# Patient Record
Sex: MALE | Race: BLACK OR AFRICAN AMERICAN | NOT HISPANIC OR LATINO | Employment: FULL TIME | URBAN - METROPOLITAN AREA
[De-identification: names, ages, dates, MRNs, and addresses within clinical notes are randomized per-mention and may not be internally consistent; named-entity substitution may affect disease eponyms.]

---

## 2018-03-23 ENCOUNTER — RECORDS - HEALTHEAST (OUTPATIENT)
Dept: LAB | Facility: CLINIC | Age: 33
End: 2018-03-23

## 2018-03-23 ENCOUNTER — RECORDS - HEALTHEAST (OUTPATIENT)
Dept: ADMINISTRATIVE | Facility: OTHER | Age: 33
End: 2018-03-23

## 2018-03-23 LAB
ALBUMIN SERPL-MCNC: 3.9 G/DL (ref 3.5–5)
ALP SERPL-CCNC: 79 U/L (ref 45–120)
ALT SERPL W P-5'-P-CCNC: 33 U/L (ref 0–45)
ANION GAP SERPL CALCULATED.3IONS-SCNC: 13 MMOL/L (ref 5–18)
AST SERPL W P-5'-P-CCNC: 26 U/L (ref 0–40)
BILIRUB SERPL-MCNC: 0.4 MG/DL (ref 0–1)
BUN SERPL-MCNC: 10 MG/DL (ref 8–22)
CALCIUM SERPL-MCNC: 9.3 MG/DL (ref 8.5–10.5)
CHLORIDE BLD-SCNC: 106 MMOL/L (ref 98–107)
CHOLEST SERPL-MCNC: 142 MG/DL
CO2 SERPL-SCNC: 23 MMOL/L (ref 22–31)
CREAT SERPL-MCNC: 0.74 MG/DL (ref 0.7–1.3)
FASTING STATUS PATIENT QL REPORTED: NO
GFR SERPL CREATININE-BSD FRML MDRD: >60 ML/MIN/1.73M2
GLUCOSE BLD-MCNC: 103 MG/DL (ref 70–125)
HDLC SERPL-MCNC: 34 MG/DL
LDLC SERPL CALC-MCNC: 91 MG/DL
POTASSIUM BLD-SCNC: 4.1 MMOL/L (ref 3.5–5)
PROT SERPL-MCNC: 7.4 G/DL (ref 6–8)
SODIUM SERPL-SCNC: 142 MMOL/L (ref 136–145)
TRIGL SERPL-MCNC: 86 MG/DL

## 2018-03-28 ENCOUNTER — RECORDS - HEALTHEAST (OUTPATIENT)
Dept: ADMINISTRATIVE | Facility: OTHER | Age: 33
End: 2018-03-28

## 2018-03-29 ENCOUNTER — COMMUNICATION - HEALTHEAST (OUTPATIENT)
Dept: ADMINISTRATIVE | Facility: CLINIC | Age: 33
End: 2018-03-29

## 2018-04-02 ENCOUNTER — RECORDS - HEALTHEAST (OUTPATIENT)
Dept: ADMINISTRATIVE | Facility: OTHER | Age: 33
End: 2018-04-02

## 2018-07-09 ENCOUNTER — OFFICE VISIT - HEALTHEAST (OUTPATIENT)
Dept: ENDOCRINOLOGY | Facility: CLINIC | Age: 33
End: 2018-07-09

## 2018-07-09 DIAGNOSIS — E29.1 HYPOGONADISM MALE: ICD-10-CM

## 2018-07-09 LAB
CALCIUM SERPL-MCNC: 10 MG/DL (ref 8.5–10.5)
CREAT SERPL-MCNC: 0.78 MG/DL (ref 0.7–1.3)
FERRITIN SERPL-MCNC: 64 NG/ML (ref 27–300)
FSH SERPL-ACNC: <3 MIU/ML (ref 0–12)
GFR SERPL CREATININE-BSD FRML MDRD: >60 ML/MIN/1.73M2
LH SERPL-ACNC: 6.2 MIU/ML
POTASSIUM BLD-SCNC: 4 MMOL/L (ref 3.5–5)
PROLACTIN SERPL-MCNC: 13.2 NG/ML (ref 0–15)
PTH-INTACT SERPL-MCNC: 36 PG/ML (ref 10–86)
T4 FREE SERPL-MCNC: 0.9 NG/DL (ref 0.7–1.8)
TSH SERPL DL<=0.005 MIU/L-ACNC: 1.85 UIU/ML (ref 0.3–5)

## 2018-07-09 ASSESSMENT — MIFFLIN-ST. JEOR: SCORE: 1568.1

## 2018-07-10 LAB — 25(OH)D3 SERPL-MCNC: 20.5 NG/ML (ref 30–80)

## 2018-07-11 LAB — TESTOST SERPL-MCNC: 1025 NG/DL (ref 240–871)

## 2018-09-19 ENCOUNTER — COMMUNICATION - HEALTHEAST (OUTPATIENT)
Dept: ADMINISTRATIVE | Facility: CLINIC | Age: 33
End: 2018-09-19

## 2018-10-03 ENCOUNTER — ALLIED HEALTH/NURSE VISIT (OUTPATIENT)
Dept: NURSING | Facility: CLINIC | Age: 33
End: 2018-10-03

## 2018-10-03 DIAGNOSIS — Z23 NEED FOR PROPHYLACTIC VACCINATION AND INOCULATION AGAINST INFLUENZA: Primary | ICD-10-CM

## 2018-10-03 PROCEDURE — 90471 IMMUNIZATION ADMIN: CPT

## 2018-10-03 PROCEDURE — 90686 IIV4 VACC NO PRSV 0.5 ML IM: CPT

## 2018-10-03 PROCEDURE — 99207 ZZC NO CHARGE NURSE ONLY: CPT

## 2018-10-03 NOTE — MR AVS SNAPSHOT
"              After Visit Summary   10/3/2018    Jeremias Salazar    MRN: [id number]           Patient Information     Date Of Birth          1985        Visit Information        Provider Department      10/3/2018 10:40 AM CARE COORDINATOR [facility]        Today's Diagnoses     Need for prophylactic vaccination and inoculation against influenza    -  1       Follow-ups after your visit        Who to contact     If you have questions or need follow up information about today's clinic visit or your schedule please contact [facility] directly at [phone].  Normal or non-critical lab and imaging results will be communicated to you by MyChart, letter or phone within 4 business days after the clinic has received the results. If you do not hear from us within 7 days, please contact the clinic through [employer] or phone. If you have a critical or abnormal lab result, we will notify you by phone as soon as possible.  Submit refill requests through [employer] or call your pharmacy and they will forward the refill request to us. Please allow 3 business days for your refill to be completed.          Additional Information About Your Visit        MyChart Information     GetSnippy lets you send messages to your doctor, view your test results, renew your prescriptions, schedule appointments and more. To sign up, go to [email or web address] . Click on \"Log in\" on the left side of the screen, which will take you to the Welcome page. Then click on \"Sign up Now\" on the right side of the page.     You will be asked to enter the access code listed below, as well as some personal information. Please follow the directions to create your username and password.     Your access code is: [email or web address]  Expires: 2019 11:40 AM     Your access code will  in 90 days. If you need help or a new code, please call your [facility] or [phone].        Care EveryWhere ID     This " is your Care EveryWhere ID. This could be used by other organizations to access your [facility] medical records  [id number]         Blood Pressure from Last 3 Encounters:   No data found for BP    Weight from Last 3 Encounters:   No data found for Wt              We Performed the Following     FLU VACCINE, SPLIT VIRUS, IM (QUADRIVALENT) [03088]- >3 YRS     Vaccine Administration, Initial [53218]        Primary Care Provider    None Specified       No primary provider on file.        Equal Access to Services     ABDULLAHI SANTO : Hadii regan muniz Soaydee, waaxda luncikadaha, qaybta kaalmada chinmayda, edith esquiveljosejuanjose arciniega . So [facility] [phone].    ATENCIÓN: Si habla español, tiene a merchant disposición servicios gratuitos de asistencia lingüística. Rhodaame al [phone].    We comply with applicable federal civil rights laws and [place] laws. We do not discriminate on the basis of race, color, national origin, age, disability, sex, sexual orientation, or gender identity.            Thank you!     Thank you for choosing [facility]  for your care. Our goal is always to provide you with excellent care. Hearing back from our patients is one way we can continue to improve our services. Please take a few minutes to complete the written survey that you may receive in the mail after your visit with us. Thank you!             Your Updated Medication List - Protect others around you: Learn how to safely use, store and throw away your medicines at www.disposemymeds.org.      Notice  As of 10/3/2018 11:40 AM    You have not been prescribed any medications.

## 2018-10-03 NOTE — PROGRESS NOTES

## 2019-01-07 ENCOUNTER — COMMUNICATION - HEALTHEAST (OUTPATIENT)
Dept: ENDOCRINOLOGY | Facility: CLINIC | Age: 34
End: 2019-01-07

## 2019-01-07 DIAGNOSIS — E29.1 HYPOGONADISM MALE: ICD-10-CM

## 2019-01-08 ENCOUNTER — AMBULATORY - HEALTHEAST (OUTPATIENT)
Dept: LAB | Facility: CLINIC | Age: 34
End: 2019-01-08

## 2019-01-08 DIAGNOSIS — E29.1 HYPOGONADISM MALE: ICD-10-CM

## 2019-01-09 LAB — TESTOST SERPL-MCNC: 635 NG/DL (ref 240–871)

## 2019-02-14 ENCOUNTER — HOSPITAL ENCOUNTER (OUTPATIENT)
Facility: CLINIC | Age: 34
Setting detail: OBSERVATION
Discharge: HOME | End: 2019-02-15
Attending: FAMILY MEDICINE | Admitting: SURGERY
Payer: COMMERCIAL

## 2019-02-14 ENCOUNTER — ANESTHESIA (OUTPATIENT)
Dept: SURGERY | Facility: CLINIC | Age: 34
End: 2019-02-14
Payer: COMMERCIAL

## 2019-02-14 ENCOUNTER — ANESTHESIA EVENT (OUTPATIENT)
Dept: SURGERY | Facility: CLINIC | Age: 34
End: 2019-02-14
Payer: COMMERCIAL

## 2019-02-14 DIAGNOSIS — Z90.49 S/P LAPAROSCOPIC APPENDECTOMY: Primary | ICD-10-CM

## 2019-02-14 DIAGNOSIS — K35.30 ACUTE APPENDICITIS WITH LOCALIZED PERITONITIS, WITHOUT PERFORATION, ABSCESS, OR GANGRENE: ICD-10-CM

## 2019-02-14 LAB
ALBUMIN SERPL-MCNC: 3.9 G/DL (ref 3.4–5)
ALP SERPL-CCNC: 82 U/L (ref 40–150)
ALT SERPL W P-5'-P-CCNC: 31 U/L (ref 0–70)
ANION GAP SERPL CALCULATED.3IONS-SCNC: 7 MMOL/L (ref 3–14)
AST SERPL W P-5'-P-CCNC: 22 U/L (ref 0–45)
BASOPHILS # BLD AUTO: 0 10E9/L (ref 0–0.2)
BASOPHILS NFR BLD AUTO: 0.1 %
BILIRUB SERPL-MCNC: 0.5 MG/DL (ref 0.2–1.3)
BUN SERPL-MCNC: 6 MG/DL (ref 7–30)
CALCIUM SERPL-MCNC: 9 MG/DL (ref 8.5–10.1)
CHLORIDE SERPL-SCNC: 104 MMOL/L (ref 94–109)
CO2 SERPL-SCNC: 28 MMOL/L (ref 20–32)
CREAT SERPL-MCNC: 0.74 MG/DL (ref 0.66–1.25)
DIFFERENTIAL METHOD BLD: NORMAL
EOSINOPHIL # BLD AUTO: 0.1 10E9/L (ref 0–0.7)
EOSINOPHIL NFR BLD AUTO: 0.6 %
ERYTHROCYTE [DISTWIDTH] IN BLOOD BY AUTOMATED COUNT: 12.7 % (ref 10–15)
GFR SERPL CREATININE-BSD FRML MDRD: >90 ML/MIN/{1.73_M2}
GLUCOSE SERPL-MCNC: 84 MG/DL (ref 70–99)
HCT VFR BLD AUTO: 41.5 % (ref 40–53)
HGB BLD-MCNC: 13.7 G/DL (ref 13.3–17.7)
IMM GRANULOCYTES # BLD: 0 10E9/L (ref 0–0.4)
IMM GRANULOCYTES NFR BLD: 0.2 %
INR PPP: 1.09 (ref 0.86–1.14)
LYMPHOCYTES # BLD AUTO: 1.6 10E9/L (ref 0.8–5.3)
LYMPHOCYTES NFR BLD AUTO: 16.8 %
MCH RBC QN AUTO: 30.6 PG (ref 26.5–33)
MCHC RBC AUTO-ENTMCNC: 33 G/DL (ref 31.5–36.5)
MCV RBC AUTO: 93 FL (ref 78–100)
MONOCYTES # BLD AUTO: 0.7 10E9/L (ref 0–1.3)
MONOCYTES NFR BLD AUTO: 6.8 %
NEUTROPHILS # BLD AUTO: 7.4 10E9/L (ref 1.6–8.3)
NEUTROPHILS NFR BLD AUTO: 75.5 %
NRBC # BLD AUTO: 0 10*3/UL
NRBC BLD AUTO-RTO: 0 /100
PLATELET # BLD AUTO: 184 10E9/L (ref 150–450)
POTASSIUM SERPL-SCNC: 4.3 MMOL/L (ref 3.4–5.3)
PROT SERPL-MCNC: 7.8 G/DL (ref 6.8–8.8)
RBC # BLD AUTO: 4.48 10E12/L (ref 4.4–5.9)
SODIUM SERPL-SCNC: 139 MMOL/L (ref 133–144)
WBC # BLD AUTO: 9.7 10E9/L (ref 4–11)

## 2019-02-14 PROCEDURE — 37000008 ZZH ANESTHESIA TECHNICAL FEE, 1ST 30 MIN: Performed by: SURGERY

## 2019-02-14 PROCEDURE — 25000128 H RX IP 250 OP 636: Performed by: SURGERY

## 2019-02-14 PROCEDURE — 25800030 ZZH RX IP 258 OP 636: Performed by: INTERNAL MEDICINE

## 2019-02-14 PROCEDURE — 71000014 ZZH RECOVERY PHASE 1 LEVEL 2 FIRST HR: Performed by: SURGERY

## 2019-02-14 PROCEDURE — 25000132 ZZH RX MED GY IP 250 OP 250 PS 637: Performed by: STUDENT IN AN ORGANIZED HEALTH CARE EDUCATION/TRAINING PROGRAM

## 2019-02-14 PROCEDURE — 36000057 ZZH SURGERY LEVEL 3 1ST 30 MIN - UMMC: Performed by: SURGERY

## 2019-02-14 PROCEDURE — 88304 TISSUE EXAM BY PATHOLOGIST: CPT | Performed by: SURGERY

## 2019-02-14 PROCEDURE — 25000128 H RX IP 250 OP 636: Performed by: NURSE ANESTHETIST, CERTIFIED REGISTERED

## 2019-02-14 PROCEDURE — G0378 HOSPITAL OBSERVATION PER HR: HCPCS

## 2019-02-14 PROCEDURE — 25000125 ZZHC RX 250: Performed by: NURSE ANESTHETIST, CERTIFIED REGISTERED

## 2019-02-14 PROCEDURE — 40000170 ZZH STATISTIC PRE-PROCEDURE ASSESSMENT II: Performed by: SURGERY

## 2019-02-14 PROCEDURE — 96367 TX/PROPH/DG ADDL SEQ IV INF: CPT

## 2019-02-14 PROCEDURE — 25000128 H RX IP 250 OP 636: Performed by: ANESTHESIOLOGY

## 2019-02-14 PROCEDURE — 99285 EMERGENCY DEPT VISIT HI MDM: CPT | Mod: Z6 | Performed by: FAMILY MEDICINE

## 2019-02-14 PROCEDURE — 25800030 ZZH RX IP 258 OP 636: Performed by: NURSE ANESTHETIST, CERTIFIED REGISTERED

## 2019-02-14 PROCEDURE — 25000128 H RX IP 250 OP 636: Performed by: STUDENT IN AN ORGANIZED HEALTH CARE EDUCATION/TRAINING PROGRAM

## 2019-02-14 PROCEDURE — 85610 PROTHROMBIN TIME: CPT | Performed by: FAMILY MEDICINE

## 2019-02-14 PROCEDURE — 96360 HYDRATION IV INFUSION INIT: CPT | Performed by: FAMILY MEDICINE

## 2019-02-14 PROCEDURE — 25000128 H RX IP 250 OP 636: Performed by: FAMILY MEDICINE

## 2019-02-14 PROCEDURE — 96361 HYDRATE IV INFUSION ADD-ON: CPT | Performed by: FAMILY MEDICINE

## 2019-02-14 PROCEDURE — 25000566 ZZH SEVOFLURANE, EA 15 MIN: Performed by: SURGERY

## 2019-02-14 PROCEDURE — 96366 THER/PROPH/DIAG IV INF ADDON: CPT | Mod: 59

## 2019-02-14 PROCEDURE — 99285 EMERGENCY DEPT VISIT HI MDM: CPT | Mod: 25 | Performed by: FAMILY MEDICINE

## 2019-02-14 PROCEDURE — 27210794 ZZH OR GENERAL SUPPLY STERILE: Performed by: SURGERY

## 2019-02-14 PROCEDURE — 96365 THER/PROPH/DIAG IV INF INIT: CPT | Mod: 59

## 2019-02-14 PROCEDURE — 37000009 ZZH ANESTHESIA TECHNICAL FEE, EACH ADDTL 15 MIN: Performed by: SURGERY

## 2019-02-14 PROCEDURE — 85025 COMPLETE CBC W/AUTO DIFF WBC: CPT | Performed by: FAMILY MEDICINE

## 2019-02-14 PROCEDURE — 80053 COMPREHEN METABOLIC PANEL: CPT | Performed by: FAMILY MEDICINE

## 2019-02-14 PROCEDURE — 71000015 ZZH RECOVERY PHASE 1 LEVEL 2 EA ADDTL HR: Performed by: SURGERY

## 2019-02-14 PROCEDURE — 36000059 ZZH SURGERY LEVEL 3 EA 15 ADDTL MIN UMMC: Performed by: SURGERY

## 2019-02-14 RX ORDER — FENTANYL CITRATE 50 UG/ML
25-50 INJECTION, SOLUTION INTRAMUSCULAR; INTRAVENOUS
Status: DISCONTINUED | OUTPATIENT
Start: 2019-02-14 | End: 2019-02-14 | Stop reason: HOSPADM

## 2019-02-14 RX ORDER — ERTAPENEM 1 G/1
INJECTION, POWDER, LYOPHILIZED, FOR SOLUTION INTRAMUSCULAR; INTRAVENOUS PRN
Status: DISCONTINUED | OUTPATIENT
Start: 2019-02-14 | End: 2019-02-14

## 2019-02-14 RX ORDER — SODIUM CHLORIDE 9 MG/ML
1000 INJECTION, SOLUTION INTRAVENOUS CONTINUOUS
Status: DISCONTINUED | OUTPATIENT
Start: 2019-02-14 | End: 2019-02-15

## 2019-02-14 RX ORDER — EPHEDRINE SULFATE 50 MG/ML
INJECTION, SOLUTION INTRAMUSCULAR; INTRAVENOUS; SUBCUTANEOUS PRN
Status: DISCONTINUED | OUTPATIENT
Start: 2019-02-14 | End: 2019-02-14

## 2019-02-14 RX ORDER — SODIUM CHLORIDE, SODIUM LACTATE, POTASSIUM CHLORIDE, CALCIUM CHLORIDE 600; 310; 30; 20 MG/100ML; MG/100ML; MG/100ML; MG/100ML
INJECTION, SOLUTION INTRAVENOUS CONTINUOUS
Status: DISCONTINUED | OUTPATIENT
Start: 2019-02-14 | End: 2019-02-14 | Stop reason: HOSPADM

## 2019-02-14 RX ORDER — CIPROFLOXACIN 2 MG/ML
400 INJECTION, SOLUTION INTRAVENOUS EVERY 12 HOURS
Status: DISCONTINUED | OUTPATIENT
Start: 2019-02-14 | End: 2019-02-15

## 2019-02-14 RX ORDER — OXYCODONE HYDROCHLORIDE 5 MG/1
5 TABLET ORAL EVERY 4 HOURS PRN
Status: DISCONTINUED | OUTPATIENT
Start: 2019-02-14 | End: 2019-02-15 | Stop reason: HOSPADM

## 2019-02-14 RX ORDER — ACETAMINOPHEN 325 MG/1
650 TABLET ORAL EVERY 4 HOURS PRN
Status: DISCONTINUED | OUTPATIENT
Start: 2019-02-14 | End: 2019-02-15

## 2019-02-14 RX ORDER — OXYCODONE HYDROCHLORIDE 5 MG/1
5-10 TABLET ORAL EVERY 4 HOURS PRN
Qty: 10 TABLET | Refills: 0 | Status: SHIPPED | OUTPATIENT
Start: 2019-02-14 | End: 2019-02-17

## 2019-02-14 RX ORDER — LIDOCAINE 40 MG/G
CREAM TOPICAL
Status: DISCONTINUED | OUTPATIENT
Start: 2019-02-14 | End: 2019-02-14 | Stop reason: HOSPADM

## 2019-02-14 RX ORDER — GLYCOPYRROLATE 0.2 MG/ML
INJECTION, SOLUTION INTRAMUSCULAR; INTRAVENOUS PRN
Status: DISCONTINUED | OUTPATIENT
Start: 2019-02-14 | End: 2019-02-14

## 2019-02-14 RX ORDER — NALOXONE HYDROCHLORIDE 0.4 MG/ML
.1-.4 INJECTION, SOLUTION INTRAMUSCULAR; INTRAVENOUS; SUBCUTANEOUS
Status: DISCONTINUED | OUTPATIENT
Start: 2019-02-14 | End: 2019-02-15 | Stop reason: HOSPADM

## 2019-02-14 RX ORDER — LIDOCAINE 40 MG/G
CREAM TOPICAL
Status: DISCONTINUED | OUTPATIENT
Start: 2019-02-14 | End: 2019-02-15 | Stop reason: HOSPADM

## 2019-02-14 RX ORDER — ERTAPENEM 1 G/1
1 INJECTION, POWDER, LYOPHILIZED, FOR SOLUTION INTRAMUSCULAR; INTRAVENOUS EVERY 24 HOURS
Status: DISCONTINUED | OUTPATIENT
Start: 2019-02-14 | End: 2019-02-14 | Stop reason: CLARIF

## 2019-02-14 RX ORDER — ONDANSETRON 2 MG/ML
4 INJECTION INTRAMUSCULAR; INTRAVENOUS EVERY 30 MIN PRN
Status: DISCONTINUED | OUTPATIENT
Start: 2019-02-14 | End: 2019-02-14 | Stop reason: HOSPADM

## 2019-02-14 RX ORDER — ONDANSETRON 2 MG/ML
INJECTION INTRAMUSCULAR; INTRAVENOUS PRN
Status: DISCONTINUED | OUTPATIENT
Start: 2019-02-14 | End: 2019-02-14

## 2019-02-14 RX ORDER — BUPIVACAINE HYDROCHLORIDE 5 MG/ML
INJECTION, SOLUTION PERINEURAL PRN
Status: DISCONTINUED | OUTPATIENT
Start: 2019-02-14 | End: 2019-02-14 | Stop reason: HOSPADM

## 2019-02-14 RX ORDER — LIDOCAINE HYDROCHLORIDE 20 MG/ML
INJECTION, SOLUTION INFILTRATION; PERINEURAL PRN
Status: DISCONTINUED | OUTPATIENT
Start: 2019-02-14 | End: 2019-02-14

## 2019-02-14 RX ORDER — PROPOFOL 10 MG/ML
INJECTION, EMULSION INTRAVENOUS PRN
Status: DISCONTINUED | OUTPATIENT
Start: 2019-02-14 | End: 2019-02-14

## 2019-02-14 RX ORDER — SODIUM CHLORIDE, SODIUM LACTATE, POTASSIUM CHLORIDE, CALCIUM CHLORIDE 600; 310; 30; 20 MG/100ML; MG/100ML; MG/100ML; MG/100ML
INJECTION, SOLUTION INTRAVENOUS CONTINUOUS
Status: DISCONTINUED | OUTPATIENT
Start: 2019-02-14 | End: 2019-02-15

## 2019-02-14 RX ORDER — HYDROMORPHONE HYDROCHLORIDE 1 MG/ML
.3-.5 INJECTION, SOLUTION INTRAMUSCULAR; INTRAVENOUS; SUBCUTANEOUS EVERY 5 MIN PRN
Status: DISCONTINUED | OUTPATIENT
Start: 2019-02-14 | End: 2019-02-14 | Stop reason: HOSPADM

## 2019-02-14 RX ORDER — DEXAMETHASONE SODIUM PHOSPHATE 4 MG/ML
INJECTION, SOLUTION INTRA-ARTICULAR; INTRALESIONAL; INTRAMUSCULAR; INTRAVENOUS; SOFT TISSUE PRN
Status: DISCONTINUED | OUTPATIENT
Start: 2019-02-14 | End: 2019-02-14

## 2019-02-14 RX ORDER — LABETALOL HYDROCHLORIDE 5 MG/ML
10 INJECTION, SOLUTION INTRAVENOUS
Status: DISCONTINUED | OUTPATIENT
Start: 2019-02-14 | End: 2019-02-14 | Stop reason: HOSPADM

## 2019-02-14 RX ORDER — ONDANSETRON 4 MG/1
4 TABLET, ORALLY DISINTEGRATING ORAL EVERY 30 MIN PRN
Status: DISCONTINUED | OUTPATIENT
Start: 2019-02-14 | End: 2019-02-14 | Stop reason: HOSPADM

## 2019-02-14 RX ORDER — FENTANYL CITRATE 50 UG/ML
INJECTION, SOLUTION INTRAMUSCULAR; INTRAVENOUS PRN
Status: DISCONTINUED | OUTPATIENT
Start: 2019-02-14 | End: 2019-02-14

## 2019-02-14 RX ORDER — AMOXICILLIN 250 MG
1 CAPSULE ORAL DAILY
Qty: 20 TABLET | Refills: 0 | Status: SHIPPED | OUTPATIENT
Start: 2019-02-14 | End: 2019-03-06

## 2019-02-14 RX ORDER — SODIUM CHLORIDE, SODIUM LACTATE, POTASSIUM CHLORIDE, CALCIUM CHLORIDE 600; 310; 30; 20 MG/100ML; MG/100ML; MG/100ML; MG/100ML
INJECTION, SOLUTION INTRAVENOUS CONTINUOUS PRN
Status: DISCONTINUED | OUTPATIENT
Start: 2019-02-14 | End: 2019-02-14

## 2019-02-14 RX ADMIN — Medication 5 MG: at 16:38

## 2019-02-14 RX ADMIN — HYDROMORPHONE HYDROCHLORIDE 0.5 MG: 1 INJECTION, SOLUTION INTRAMUSCULAR; INTRAVENOUS; SUBCUTANEOUS at 16:24

## 2019-02-14 RX ADMIN — CIPROFLOXACIN 400 MG: 2 INJECTION, SOLUTION INTRAVENOUS at 21:46

## 2019-02-14 RX ADMIN — FENTANYL CITRATE 25 MCG: 50 INJECTION, SOLUTION INTRAMUSCULAR; INTRAVENOUS at 18:26

## 2019-02-14 RX ADMIN — PROPOFOL 100 MG: 10 INJECTION, EMULSION INTRAVENOUS at 15:50

## 2019-02-14 RX ADMIN — FENTANYL CITRATE 100 MCG: 50 INJECTION, SOLUTION INTRAMUSCULAR; INTRAVENOUS at 15:50

## 2019-02-14 RX ADMIN — ERTAPENEM SODIUM 1 G: 1 INJECTION, POWDER, LYOPHILIZED, FOR SOLUTION INTRAMUSCULAR; INTRAVENOUS at 16:00

## 2019-02-14 RX ADMIN — ROCURONIUM BROMIDE 50 MG: 10 INJECTION INTRAVENOUS at 15:50

## 2019-02-14 RX ADMIN — SUGAMMADEX 200 MG: 100 INJECTION, SOLUTION INTRAVENOUS at 16:53

## 2019-02-14 RX ADMIN — SODIUM CHLORIDE, POTASSIUM CHLORIDE, SODIUM LACTATE AND CALCIUM CHLORIDE: 600; 310; 30; 20 INJECTION, SOLUTION INTRAVENOUS at 18:46

## 2019-02-14 RX ADMIN — METRONIDAZOLE 500 MG: 500 INJECTION, SOLUTION INTRAVENOUS at 20:39

## 2019-02-14 RX ADMIN — GLYCOPYRROLATE 0.2 MG: 0.2 INJECTION, SOLUTION INTRAMUSCULAR; INTRAVENOUS at 16:32

## 2019-02-14 RX ADMIN — FENTANYL CITRATE 25 MCG: 50 INJECTION, SOLUTION INTRAMUSCULAR; INTRAVENOUS at 17:59

## 2019-02-14 RX ADMIN — ONDANSETRON 4 MG: 2 INJECTION INTRAMUSCULAR; INTRAVENOUS at 16:24

## 2019-02-14 RX ADMIN — SODIUM CHLORIDE, POTASSIUM CHLORIDE, SODIUM LACTATE AND CALCIUM CHLORIDE: 600; 310; 30; 20 INJECTION, SOLUTION INTRAVENOUS at 16:42

## 2019-02-14 RX ADMIN — PHENYLEPHRINE HYDROCHLORIDE 100 MCG: 10 INJECTION, SOLUTION INTRAMUSCULAR; INTRAVENOUS; SUBCUTANEOUS at 15:59

## 2019-02-14 RX ADMIN — Medication 5 MG: at 16:30

## 2019-02-14 RX ADMIN — SODIUM CHLORIDE, POTASSIUM CHLORIDE, SODIUM LACTATE AND CALCIUM CHLORIDE: 600; 310; 30; 20 INJECTION, SOLUTION INTRAVENOUS at 12:55

## 2019-02-14 RX ADMIN — ONDANSETRON 4 MG: 2 INJECTION INTRAMUSCULAR; INTRAVENOUS at 16:53

## 2019-02-14 RX ADMIN — LIDOCAINE HYDROCHLORIDE 60 MG: 20 INJECTION, SOLUTION INFILTRATION; PERINEURAL at 15:50

## 2019-02-14 RX ADMIN — DEXAMETHASONE SODIUM PHOSPHATE 8 MG: 4 INJECTION, SOLUTION INTRA-ARTICULAR; INTRALESIONAL; INTRAMUSCULAR; INTRAVENOUS; SOFT TISSUE at 16:24

## 2019-02-14 RX ADMIN — SODIUM CHLORIDE 1000 ML: 9 INJECTION, SOLUTION INTRAVENOUS at 11:02

## 2019-02-14 RX ADMIN — SODIUM CHLORIDE, POTASSIUM CHLORIDE, SODIUM LACTATE AND CALCIUM CHLORIDE: 600; 310; 30; 20 INJECTION, SOLUTION INTRAVENOUS at 15:15

## 2019-02-14 RX ADMIN — OXYCODONE HYDROCHLORIDE 5 MG: 5 TABLET ORAL at 20:32

## 2019-02-14 RX ADMIN — MIDAZOLAM 2 MG: 1 INJECTION INTRAMUSCULAR; INTRAVENOUS at 15:36

## 2019-02-14 ASSESSMENT — ENCOUNTER SYMPTOMS
SINUS PAIN: 0
DECREASED CONCENTRATION: 0
COUGH: 0
COLOR CHANGE: 0
DYSPHORIC MOOD: 0
ACTIVITY CHANGE: 1
SHORTNESS OF BREATH: 0
NECK STIFFNESS: 0
FEVER: 0
SEIZURES: 0
DYSURIA: 0
ARTHRALGIAS: 0
CHILLS: 0
FLANK PAIN: 0
HEADACHES: 0
BACK PAIN: 0
ABDOMINAL PAIN: 1
BRUISES/BLEEDS EASILY: 0
DIFFICULTY URINATING: 0
HEMATURIA: 0
WEAKNESS: 0
APPETITE CHANGE: 1
CONFUSION: 0
EYE REDNESS: 0

## 2019-02-14 NOTE — ED NOTES
Bed: ED14  Expected date:   Expected time:   Means of arrival:   Comments:  [facility]  [facility] pt, abdominal pain, N/V  YELLOW

## 2019-02-14 NOTE — ED NOTES
Bed: IN02  Expected date:   Expected time:   Means of arrival:   Comments:  Jeremias avery from [facility] ED with appy ETA 12 noon

## 2019-02-14 NOTE — ANESTHESIA CARE TRANSFER NOTE
Patient: Jeremias Matias    Procedure(s):  LAPAROSCOPIC APPENDECTOMY    Diagnosis: acute appendicitis  Diagnosis Additional Information: No value filed.    Anesthesia Type:   No value filed.     Note:  Airway :Face Mask  Patient transferred to:PACU  Comments: To PACU, VSS, airway patent, RN at bedside.Handoff Report: Identifed the Patient, Identified the Reponsible Provider, Reviewed the pertinent medical history, Discussed the surgical course, Reviewed Intra-OP anesthesia mangement and issues during anesthesia, Set expectations for post-procedure period and Allowed opportunity for questions and acknowledgement of understanding      Vitals: (Last set prior to Anesthesia Care Transfer)    CRNA VITALS  2/14/2019 1646 - 2/14/2019 1723      2/14/2019             NIBP:  137/68    Pulse:  87                Electronically Signed By: WOO Leon CRNA  February 14, 2019  5:23 PM

## 2019-02-14 NOTE — ED PROVIDER NOTES
History     Chief Complaint   Patient presents with     Abdominal Pain     see note      HPI  Jeremias Matias is a 34 year old male who presents to the Emergency Department for the evaluation of abdominal pain. Patient went to [facility] ED this morning where he had CT abdomen indicating acute appendicitis and was advised to get surgery immediately. He decided to come to the  for surgery this morning as he needed to bring his son here for his surgery as well. Patient notes 2 days of abdominal pain, mostly RLQ, that radiates to groin. Denies fever, chills, chest pain, shortness of breath, dysuria, cough. Denies history of kidney stone. Patient was given ertapenem at [facility].  Pain was better after he did receive antibiotics and narcotics at [facility].  Now notes increasing pain without back pain nausea vomiting or fever chills.    CT Abd Pelvis W IV Cont2/14/2019  [facility]  Result Narrative   EXAM: CT ABD PELVIS W IV CONT  LOCATION: [facility]  DATE/TIME: 2/14/2019 3:58 AM    INDICATION: Rlq pain  COMPARISON: None.  TECHNIQUE: Helical enhanced thin-section CT scan of the abdomen and pelvis was performed following injection of IV contrast. Multiplanar reformats were obtained. Dose reduction techniques were used.   CONTRAST: IOHEXOL 350 MG/ML IV SOLN 100 mL    FINDINGS:   LUNG BASES: Clear.    ABDOMEN: Liver, spleen, adrenals, pancreas and kidneys within normal limits. Normal caliber small bowel.    PELVIS: Appendix measures up to 11 mm with wall thickening and mild adjacent inflammation. Proximal appendicolith. No significant free fluid. Mild distention of the bladder.    MUSCULOSKELETAL: Within normal limits.    CONCLUSION:   1.  Acute appendicitis. No bowel obstruction or abscess.    2.  Results called to Dr. Santos at 0404   Other Result Information         I have reviewed the Medications, Allergies, Past Medical and Surgical History, and Social History in the Epic system.  No past medical history on  file.    No past surgical history on file.    No family history on file.    Social History     Tobacco Use     Smoking status: Not on file   Substance Use Topics     Alcohol use: Not on file       Current Facility-Administered Medications   Medication     0.9% sodium chloride BOLUS    Followed by     sodium chloride 0.9% infusion     lidocaine (LMX4) cream     lidocaine 1 % 0.1-1 mL     sodium chloride (PF) 0.9% PF flush 3 mL     sodium chloride (PF) 0.9% PF flush 3 mL     No current outpatient medications on file.      Not on File    Review of Systems   Constitutional: Positive for activity change (ab pain with movement) and appetite change. Negative for chills and fever.   HENT: Negative for congestion and sinus pain.    Eyes: Negative for redness and visual disturbance.   Respiratory: Negative for cough and shortness of breath.    Cardiovascular: Negative for chest pain.   Gastrointestinal: Positive for abdominal pain (RLQ).   Genitourinary: Negative for difficulty urinating, dysuria, enuresis, flank pain and hematuria.   Musculoskeletal: Negative for arthralgias, back pain and neck stiffness.   Skin: Negative for color change and rash.   Neurological: Negative for weakness and headaches.   Hematological: Does not bruise/bleed easily.   Psychiatric/Behavioral: Negative for confusion, decreased concentration and dysphoric mood.   All other systems reviewed and are negative.      Physical Exam   BP: 128/64  Pulse: 71  Temp: 98.3  F (36.8  C)  Resp: 18  Weight: 68 kg (150 lb)  SpO2: 98 %      Physical Exam   Constitutional: He is oriented to person, place, and time. He appears well-developed and well-nourished. He appears distressed.   Patient mild distress is nontoxic here.  Alert and oriented.  Vital signs stable.  Is afebrile.   HENT:   Head: Normocephalic and atraumatic.   Mouth/Throat: Oropharynx is clear and moist.   Eyes: Conjunctivae are normal. Pupils are equal, round, and reactive to light. No scleral  icterus.   Neck: Normal range of motion. Neck supple.   Cardiovascular: Normal rate, normal heart sounds and intact distal pulses.   Pulmonary/Chest: Breath sounds normal. No respiratory distress.   Abdominal: Soft. Bowel sounds are normal. There is tenderness.   Abdomen is nonrigid soft flat tenderness localized right lower quadrant with some rebound consistent with tenderness at McBurneys point.  No masses noted.  No bruising no rash no flank tenderness.   Musculoskeletal: He exhibits no edema or tenderness.   Neurological: He is alert and oriented to person, place, and time.   nonfocal   Skin: Skin is warm and dry. Capillary refill takes less than 2 seconds. No rash noted. He is not diaphoretic.   Psychiatric:   appropriate   Nursing note and vitals reviewed.      ED Course   10:41 AM  The patient was seen and examined by Tavo Chambers MD in Room 3.        Procedures         Records reviewed in [facility] with [facility]'s [facility] concerning for [facility]icitis.  Patient had received ertapenem and pain medication in the ER.  Recommended to come to the hospital soon as he gets his son to the appropriate place for his son surgery also.  I paged Dr. Rollins several times noted that he is out of town.  Currently paging surgery general staff and talked with Dr. Giles who advised to [facility] for surgery to see.  Surgery resident aware also and will see patient when arrive in ER.  Patient npo  Patient understands and agrees with plan.       Critical Care time:  none             Labs Ordered and Resulted from Time of ED Arrival Up to the Time of Departure from the ED   CBC WITH PLATELETS DIFFERENTIAL   INR   COMPREHENSIVE METABOLIC PANEL   PERIPHERAL IV CATHETER   PULSE OXIMETRY NURSING     Results for orders placed or performed during the hospital encounter of 02/14/19   CBC with platelets differential   Result Value Ref Range    WBC 9.7 4.0 - 11.0 10e9/L    RBC Count  4.48 4.4 - 5.9 10e12/L    Hemoglobin 13.7 13.3 - 17.7 g/dL    Hematocrit 41.5 40.0 - 53.0 %    MCV 93 78 - 100 fl    MCH 30.6 26.5 - 33.0 pg    MCHC 33.0 31.5 - 36.5 g/dL    RDW 12.7 10.0 - 15.0 %    Platelet Count 184 150 - 450 10e9/L    Diff Method Automated Method     % Neutrophils 75.5 %    % Lymphocytes 16.8 %    % Monocytes 6.8 %    % Eosinophils 0.6 %    % Basophils 0.1 %    % Immature Granulocytes 0.2 %    Nucleated RBCs 0 0 /100    Absolute Neutrophil 7.4 1.6 - 8.3 10e9/L    Absolute Lymphocytes 1.6 0.8 - 5.3 10e9/L    Absolute Monocytes 0.7 0.0 - 1.3 10e9/L    Absolute Eosinophils 0.1 0.0 - 0.7 10e9/L    Absolute Basophils 0.0 0.0 - 0.2 10e9/L    Abs Immature Granulocytes 0.0 0 - 0.4 10e9/L    Absolute Nucleated RBC 0.0    INR   Result Value Ref Range    INR 1.09 0.86 - 1.14              Assessments & Plan (with Medical Decision Making)  34-year-old male presents the ER confirmed appendicitis by CT earlier this morning at [facility].  Patient had to get his son to his son schedule surgery at [facility] today.  Patient's son is with his wife.  Patient now presents the ER with ongoing pain right lower quadrant tenderness McBurney's point his abdomen still is soft.  His white count was 9.5.  He did receive IV ertapenem this morning prior at [facility] also.  Patient given IV fluids here in the ER is kept n.p.o. initially tried to page Dr. Rollins but he is out of town.  I talked to surgery staff at the [place] and the surgical resident.  Recomendations transfer patient to the ER where he will be seen by surgery.  I talked to Dr. Ward also was aware.  Patient understands too and then transferred.         I have reviewed the nursing notes.    I have reviewed the findings, diagnosis, plan and need for follow up with the patient.       Medication List      There are no discharge medications for this visit.         Final diagnoses:   Acute appendicitis with localized peritonitis, without perforation,  abscess, or gangrene     I, Justin Dickey, am serving as a trained medical scribe to document services personally performed by Tavo Chambers MD, based on the provider's statements to me.   I, Tavo Chambers MD, was physically present and have reviewed and verified the accuracy of this note documented by Justin Dickey.    2/14/2019   [facility], [place], EMERGENCY DEPARTMENT     Tavo Chambers MD  02/14/19 1838

## 2019-02-14 NOTE — ED NOTES
Pt coming from [facility] ED earlier today. Was advised to come here ASAP due to acute appendicitis. Pt refused surgery at [facility] and left as he wanted to be at [facility] this morning for his sons long awaited surgery. Son is now in surgery so pt presents to ED to discuss surgery options here.   Pt was given abx in the ED along with narcotics.

## 2019-02-14 NOTE — ED NOTES
Patient presented to the [facility] Emergency Department with abdominal pain for the past 2 days; RLQ radiating to his groin. Patient had reportedly been to [facility] ED earlier today for this abdominal pain; patient had a CT abdomen/pelbis that showed acute appendicitis and was advised he needed surgery. Patient was going to undergo surgery at [facility]; however, he left AMA in order to provide care for his son who is undergoing surgery at [facility] today. Patient had received 1 g of IV ertapenem at [facility] and this helped with his pain; however, he had an increase in his pain and presented to [facility]. Patient had a white count of 9.5. After talking to my colleague Dr. Chambers, the patient is being transferred to the [place] [facility] Department here for an appendectomy. Patients last ate or drank fluids last night at midnight.    IMaurilio, am serving as a trained medical scribe to document services personally performed by Bhakti Ward MD, based on the provider's statements to me.   IBhakti MD, was physically present and have reviewed and verified the accuracy of this note documented by Maurilio Soler.      Bhakti Ward MD  February 14, 2019 1:10 PM

## 2019-02-14 NOTE — ANESTHESIA PREPROCEDURE EVALUATION
Anesthesia Pre-Procedure Evaluation    Patient: Jeremias Matias   MRN:     [id number] Gender:   male   Age:    34 year old :      1985        Preoperative Diagnosis: acute appendicitis   Procedure(s):  LAPAROSCOPIC APPENDECTOMY     No past medical history on file.   History reviewed. No pertinent surgical history.       Anesthesia Evaluation     . Pt has not had prior anesthetic            ROS/MED HX    ENT/Pulmonary:       Neurologic:  - neg neurologic ROS    (-) seizures and CVA   Cardiovascular:  - neg cardiovascular ROS       METS/Exercise Tolerance:     Hematologic:         Musculoskeletal:         GI/Hepatic: Comment: Acute appendicitis        Renal/Genitourinary:         Endo:         Psychiatric:         Infectious Disease:         Malignancy:         Other:                         PHYSICAL EXAM:   Mental Status/Neuro: A/A/O   Airway: Facies: Feasible  Mallampati: II  Mouth/Opening: Full  TM distance: < 6 cm  Neck ROM: Full   Respiratory: Auscultation: CTAB     Resp. Rate: Normal     Resp. Effort: Normal      CV: Rhythm: Regular  Rate: Age appropriate   Comments:                    Lab Results   Component Value Date    WBC 9.7 2019    HGB 13.7 2019    HCT 41.5 2019     2019     2019    POTASSIUM 4.3 2019    CHLORIDE 104 2019    CO2 28 2019    BUN 6 (L) 2019    CR 0.74 2019    GLC 84 2019    TESSA 9.0 2019    ALBUMIN 3.9 2019    PROTTOTAL 7.8 2019    ALT 31 2019    AST 22 2019    ALKPHOS 82 2019    BILITOTAL 0.5 2019    INR 1.09 2019       Preop Vitals  BP Readings from Last 3 Encounters:   19 110/65    Pulse Readings from Last 3 Encounters:   19 75      Resp Readings from Last 3 Encounters:   19 21    SpO2 Readings from Last 3 Encounters:   19 100%      Temp Readings from Last 1 Encounters:   19 36.8  C (98.3  F) (Oral)    Ht Readings from Last 1  Encounters:   No data found for Ht      Wt Readings from Last 1 Encounters:   02/14/19 68 kg (150 lb)    There is no height or weight on file to calculate BMI.     LDA:  Peripheral IV 02/14/19 Left Upper forearm (Active)   Site Assessment WDL 2/14/2019  3:44 PM   Line Status Infusing 2/14/2019  3:44 PM   Phlebitis Scale 0-->no symptoms 2/14/2019  3:44 PM   Number of days: 0       Peripheral IV 02/14/19 Left Hand (Active)   Number of days: 0       ETT (adult) 7.5 (Active)   Number of days: 0       Urethral Catheter Latex 14 fr (Active)   Number of days: 0            Assessment:   ASA SCORE: 1    NPO Status: > 6 hours since completed [employer]   Documentation: H&P complete; Preop Testing complete; Consents complete   Proceeding: Proceed without further delay     Plan:   Anes. Type:  General   Pre-Induction: Midazolam IV; Acetaminophen PO   Induction:  IV (Standard)   Airway: Oral ETT   Access/Monitoring: PIV   Maintenance: Balanced   Emergence: Procedure Site   Logistics: Same Day Surgery     Postop Pain/Sedation Strategy:  Standard-Options: Opioids PRN     PONV Management:  Adult Risk Factors:, Postop Opioids  Prevention: Ondansetron     CONSENT: Direct conversation   Plan and risks discussed with: Patient                            Brigida Mcgrath MD

## 2019-02-14 NOTE — OP NOTE
[facility], [place]    Operative Note    Pre-operative diagnosis: acute appendicitis   Post-operative diagnosis same   Procedure: Procedure(s):  LAPAROSCOPIC APPENDECTOMY   Surgeon: Surgeon(s) and Role:     * Florencio Marsh MD - Primary     * Amado Zapata MD - Assisting     * Momo Davila MD - Resident - Assisting   Anesthesia: General    Estimated blood loss: 5ml   Drains: None   Specimens: ID Type Source Tests Collected by Time Destination   A : Appendix Tissue Appendix SURGICAL PATHOLOGY EXAM Momo Davila MD 2/14/2019  4:44 PM       Findings:  The appendix was  inflamed.  It was not perforated.     Complications: None.   Implants: None.         OPERATIVE INDICATIONS:  Jeremias Matias is a 34 year old-year-old male with a history of right lower quadrant abdominal pain.  The pain started two days previously.  The white blood cell count was 9 thousand per microliter.    CT scan of the abdomen and pelvis showed acute appendicitis.  After understanding the risks and benefits of proceeding with surgery, the patient has an indication for laparoscopic appendectomy and consented to undergo surgery.    OPERATIVE DETAILS:  The patient was brought to the operating room and prepared in a routine fashion.  A timeout was performed prior to surgery and documented by the nursing team.      Under the benefits of general anesthesia, an infra-umbilical midline incision was made and dissected down to the level of the fascia.  The fascia was grasped and divided sharply and the abdominal cavity was entered.  The 12mm spence port was placed bluntly into the abdomen and pneumoperitoneum was established using carbon dioxide gas to a maximum pressure of 15 mmHg.  A total of 3 ports were placed and the 5 mm laparoscope was utilized.  The patient was moved into a position with the right side up.  The cecum was identified and the appendix was identified at the confluence of the tenia coli.   The appendix was elevated.  A window was made at the base of the appendix in the mesoappendix.  A blue load endo-MARILUZ stapler was then used to divide the appendix at its base and a short stump was left.  Next, the appendix was elevated and the mesoappendix was divided using a white load endo-MARILUZ stapler.  The appendix was placed into an endocatch bag and removed from the 12mm port.  The area of the appendix was irrigated with saline.  Hemostasis was confirmed.  The 12mm incision was closed using 0 Vicryl suture.  All skin incisions were closed using 4-0 monocryl suture and dermabond was applied.  Dr. Marsh was present for all critical portions of the procedure.

## 2019-02-14 NOTE — H&P
"Surgery History and Physical    Jeremias Matias  MRN: [id number]  male  34 year old    Chief Complaint:  Acute appendicitis    HPI:  34 year old male with no significant PMHx who presented to [facility] ED last night with acute abdominal pain. He had a CT A/P completed consistent with appendicitis however when he was told he had to complete surgery, he declined as [facility] He received ertapenem and pain medication before being discharge from the ED. Today he came to the [facility] ED with persistent abdominal pain. His pain originally started a few days ago as vague RLQ pain, however yesterday evening he at around 6:30pm his pain acutely worsened which he described as sharp and \"squeezing\" that radiated to his groin. The pain was originally waxing and waning before becoming constant. He has some pain while urinating, however denies burning or hematuria. He denies fever, chills, CP, SOB, vomiting, diarrhea. The last time he ate or drank anything was midnight last night. He denies any history of bleeding or clotting disorders.     Past Surgical History: No previous surgeries    Past Medical History: No PMHx    Social History:   Never smoker, never drinks    Family History: No family history of sudden death. No other pertinent family history.     Allergies: None    Active Medications:   Takes no medications    ROS:  Otherwise negative    Physical Examination:   Vital Signs: /66   Pulse 71   Temp 98.3  F (36.8  C) (Oral)   Resp 18   Wt 68 kg (150 lb)   SpO2 100%   GEN: alert, healthy, no distress  EENT: normal  Chest: NLB on RA, regular rate  Abdomen: soft, tender to palpation over RLQ over McBurney's point + rebound tenderness, voluntary guarding, negative Psoas sign, non-distended  Extremities: WWP, no edema    Labs/Imaging/Other:  BMP  Recent Labs   Lab 02/14/19  1053      POTASSIUM 4.3   CHLORIDE 104   TESSA 9.0   CO2 28   BUN 6*   CR 0.74   GLC 84 "     CBC  Recent Labs   Lab 02/14/19  1053   WBC 9.7   RBC 4.48   HGB 13.7   HCT 41.5   MCV 93   MCH 30.6   MCHC 33.0   RDW 12.7        INR  Recent Labs   Lab 02/14/19  1053   INR 1.09      AST/ALT & Alk Phos  Recent Labs   Lab 02/14/19  1053   AST 22   ALT 31   ALKPHOS 82     Bili  Recent Labs   Lab Test 02/14/19  1053   BILITOTAL 0.5     CT A/P w IV Contrast: 2/14/19  FINDINGS:   LUNG BASES: Clear.    ABDOMEN: Liver, spleen, adrenals, pancreas and kidneys within normal limits. Normal caliber small bowel.    PELVIS: Appendix measures up to 11 mm with wall thickening and mild adjacent inflammation. Proximal appendicolith. No significant free fluid. Mild distention of the bladder.    MUSCULOSKELETAL: Within normal limits.    CONCLUSION:   1.  Acute appendicitis. No bowel obstruction or abscess.    Admitting Diagnosis:   1. Acute appendicitis with localized peritonitis, without perforation, abscess, or gangrene        Assessment & Plan:  34 year old male with no PMHx who presents with abdominal pain and imaging consistent with appendicitis.    - OR today for laparoscopic appendectomy  - NPO   - mIVF  - cipro/flagyl per pharmacy recs  - floor for monitoring overnight  - pain controll  - consent and pre-op orders in place     D/w Dr. Salma Hernandez MD  PGY-1 General Surgery

## 2019-02-15 VITALS
HEART RATE: 58 BPM | DIASTOLIC BLOOD PRESSURE: 72 MMHG | SYSTOLIC BLOOD PRESSURE: 111 MMHG | TEMPERATURE: 98 F | WEIGHT: 150 LBS | RESPIRATION RATE: 16 BRPM | OXYGEN SATURATION: 100 %

## 2019-02-15 LAB
ALBUMIN UR-MCNC: NEGATIVE MG/DL
APPEARANCE UR: CLEAR
BILIRUB UR QL STRIP: NEGATIVE
COLOR UR AUTO: ABNORMAL
GLUCOSE BLDC GLUCOMTR-MCNC: 146 MG/DL (ref 70–99)
GLUCOSE UR STRIP-MCNC: 70 MG/DL
HGB UR QL STRIP: NEGATIVE
KETONES UR STRIP-MCNC: NEGATIVE MG/DL
LEUKOCYTE ESTERASE UR QL STRIP: NEGATIVE
MUCOUS THREADS #/AREA URNS LPF: PRESENT /LPF
NITRATE UR QL: NEGATIVE
PH UR STRIP: 6.5 PH (ref 5–7)
RBC #/AREA URNS AUTO: 0 /HPF (ref 0–2)
SOURCE: ABNORMAL
SP GR UR STRIP: 1.01 (ref 1–1.03)
UROBILINOGEN UR STRIP-MCNC: NORMAL MG/DL (ref 0–2)
WBC #/AREA URNS AUTO: <1 /HPF (ref 0–5)

## 2019-02-15 PROCEDURE — 96368 THER/DIAG CONCURRENT INF: CPT

## 2019-02-15 PROCEDURE — 96366 THER/PROPH/DIAG IV INF ADDON: CPT

## 2019-02-15 PROCEDURE — 25000128 H RX IP 250 OP 636: Performed by: STUDENT IN AN ORGANIZED HEALTH CARE EDUCATION/TRAINING PROGRAM

## 2019-02-15 PROCEDURE — 25800030 ZZH RX IP 258 OP 636: Performed by: FAMILY MEDICINE

## 2019-02-15 PROCEDURE — 25000132 ZZH RX MED GY IP 250 OP 250 PS 637: Performed by: STUDENT IN AN ORGANIZED HEALTH CARE EDUCATION/TRAINING PROGRAM

## 2019-02-15 PROCEDURE — G0378 HOSPITAL OBSERVATION PER HR: HCPCS

## 2019-02-15 PROCEDURE — 81001 URINALYSIS AUTO W/SCOPE: CPT | Performed by: STUDENT IN AN ORGANIZED HEALTH CARE EDUCATION/TRAINING PROGRAM

## 2019-02-15 PROCEDURE — 00000146 ZZHCL STATISTIC GLUCOSE BY METER IP

## 2019-02-15 RX ORDER — ACETAMINOPHEN 325 MG/1
975 TABLET ORAL EVERY 8 HOURS
Status: DISCONTINUED | OUTPATIENT
Start: 2019-02-15 | End: 2019-02-15 | Stop reason: HOSPADM

## 2019-02-15 RX ADMIN — METRONIDAZOLE 500 MG: 500 INJECTION, SOLUTION INTRAVENOUS at 04:35

## 2019-02-15 RX ADMIN — SODIUM CHLORIDE 1000 ML: 9 INJECTION, SOLUTION INTRAVENOUS at 04:35

## 2019-02-15 RX ADMIN — ACETAMINOPHEN 975 MG: 325 TABLET, FILM COATED ORAL at 10:39

## 2019-02-15 RX ADMIN — OXYCODONE HYDROCHLORIDE 5 MG: 5 TABLET ORAL at 04:45

## 2019-02-15 NOTE — OR NURSING
Dr. Carpio came to bedside to see patient and okayed patient to go to floor. Will write a sign out.

## 2019-02-15 NOTE — PLAN OF CARE
Outpatient/Observation goals to be met before discharge home:   - Adequate pain control on oral analgesia: Yes  - Vital Signs normal or at patient baseline: Yes   - Tolerating oral intake to maintain hydration: Yes  - Diagnostic tests and consults completed and resulted: for U/A, still waiting for pt to void.   - Cleared for discharge from consultants' standpoint if involved in care: discharge in place after pt able to void for urine test/culture  - Safe disposition plan has been identified: Yes  - Return to baseline functional status: Yes

## 2019-02-15 NOTE — PROGRESS NOTES
Post Op Check    02/14/2019    Jeremias Matias is a 34 year old male with h/o acute appendicitis now POD#0 s/p laparoscopic appendectomy.    Pt reports continuing abdominal pain, requesting additional pain medications as tylenol is not controlling his pain. Denies SOB, chest pain, nausea, vomiting, fevers, chills. Voiding independently without issue.    /72 (BP Location: Right arm)   Pulse 99   Temp 98.6  F (37  C) (Oral)   Resp 16   Wt 68 kg (150 lb)   SpO2 98%     Gen: A&O x3, NAD. Sleeping upon entry.  Chest: breathing non-labored on room air.  Abdomen: soft, appropriately tender, nondistended.   Incision: clean, dry, intact. Covered in dermabond.  Extremities: warm and well perfused. Peripheral pulses intact.    I/O last 3 completed shifts:  In: 1940 [P.O.:240; I.V.:1700]  Out: 630 [Urine:625; Blood:5]    A/P: Will add oxycodone 5 mg x3 doses for overnight pain control. Patient wishes to go home tomorrow due to continuing pain. No acute post-op issues. Continue plan of care per primary team. Please call with any questions.    Melodie Goodwin MD  General Surgery PGY-1

## 2019-02-15 NOTE — ANESTHESIA POSTPROCEDURE EVALUATION
Anesthesia POST Procedure Evaluation    Patient: Jeremias Matias   MRN:     [id number] Gender:   male   Age:    34 year old :      1985        Preoperative Diagnosis: acute appendicitis   Procedure(s):  LAPAROSCOPIC APPENDECTOMY   Postop Comments: No value filed.       Anesthesia Type:  General    Reportable Event: NO     PAIN: Uncomplicated   Sign Out status: Comfortable, Well controlled pain     PONV: No PONV   Sign Out status:  No Nausea or Vomiting     Neuro/Psych: Uneventful perioperative course   Sign Out Status: Preoperative baseline; Age appropriate mentation     Airway/Resp.: Uneventful perioperative course   Sign Out Status: Non labored breathing, age appropriate RR; Resp. Status within EXPECTED Parameters     CV: Uneventful perioperative course   Sign Out status: Appropriate BP and perfusion indices; Appropriate HR/Rhythm     Disposition:   Sign Out in:  PACU  Disposition:  Floor  Recovery Course: Uneventful  Follow-Up: Not required           Last Anesthesia Record Vitals:  CRNA VITALS  2019 1646 - 2019 1746      2019             NIBP:  137/68    Pulse:  87          Last PACU/Preop Vitals:  Vitals:    02/15/19 0000 02/15/19 0441 02/15/19 0600   BP:  123/75    Pulse:  51    Resp:  18    Temp:  36.7  C (98.1  F)    SpO2: 98% 100% 100%         Electronically Signed By: Brigida Mcgrath MD, February 15, 2019, 6:29 AM

## 2019-02-15 NOTE — DISCHARGE SUMMARY
General Surgery Discharge Summary    Jeremias Matias MRN# [id number]   YOB: 1985 Age: 34 year old     Date of Admission:  2/14/2019  Date of Discharge::  2/15/2019  Admitting Physician:  Florencio Marsh MD  Discharge Physician:  Florencio Marsh MD  Primary Care Physician:        No Ref-Primary, Physician          Admission Diagnoses:   Acute appendicitis with localized peritonitis, without perforation, abscess, or gangrene [K35.30]  S/P laparoscopic appendectomy [Z90.49]          Discharge Diagnosis:   Same         Procedures:   Laparoscopic appendectomy by Dr. Marsh on 2/14/2019.        Non-operative procedures:   None performed          Consultations:   None         Medications Prior to Admission:     No medications prior to admission.            Discharge Medications:     Current Discharge Medication List      START taking these medications    Details   oxyCODONE (ROXICODONE) 5 MG tablet Take 1-2 tablets (5-10 mg) by mouth every 4 hours as needed for moderate to severe pain  Qty: 10 tablet, Refills: 0    Associated Diagnoses: Acute appendicitis with localized peritonitis, without perforation, abscess, or gangrene; S/P laparoscopic appendectomy      senna-docusate (SENOKOT-S/PERICOLACE) 8.6-50 MG tablet Take 1 tablet by mouth daily for 20 days  Qty: 20 tablet, Refills: 0    Associated Diagnoses: S/P laparoscopic appendectomy                   Day of Discharge Exam   /72 (BP Location: Right arm)   Pulse 58   Temp 98  F (36.7  C) (Oral)   Resp 16   Wt 68 kg (150 lb)   SpO2 100%     General:  A&Ox3, NAD  Cardio:   RRR  Chest:   Non labored breathing on RA  Abd:   Soft, non-distended, appropriately TTP, incision c/d/i  Ext:   WWP          Brief History of Illness:   33 yo M with no significant PMH with 2 days of RLQ abdominal pain. CTAP showing acute appendicitis. Vitals were stable and patient without leukocytosis. Started on cipro and flagyl and brought to OR for  surgical management.           Hospital Course:   The patient was admitted and underwent the above procedure. The patient tolerated the procedure well. There were no complications. Postoperatively the patient was transferred to the general floor for further care. The patient's diet was slowly advanced as bowel function returned. Pain was controlled with oral pain medication and the patient was able to ambulate and void without difficulty. The patient received appropriate education post operatively. On POD 1 the patient was discharged to home with appropriate instructions and follow up. The patient acknowledged understanding and were in agreement with the plan.         Antibiotics Prescribed at Discharge:   None prescribed         Imaging Studies:   CT abdomen and pelvis performed at [facility] on 2/14/2019.    EXAM: CT ABD PELVIS W IV CONT  LOCATION: [facility]  DATE/TIME: 2/14/2019 3:58 AM    INDICATION: Rlq pain  COMPARISON: None.  TECHNIQUE: Helical enhanced thin-section CT scan of the abdomen and pelvis was performed following injection of IV contrast. Multiplanar reformats were obtained. Dose reduction techniques were used.   CONTRAST: IOHEXOL 350 MG/ML IV SOLN 100 mL    FINDINGS:   LUNG BASES: Clear.    ABDOMEN: Liver, spleen, adrenals, pancreas and kidneys within normal limits. Normal caliber small bowel.    PELVIS: Appendix measures up to 11 mm with wall thickening and mild adjacent inflammation. Proximal appendicolith. No significant free fluid. Mild distention of the bladder.    MUSCULOSKELETAL: Within normal limits.    CONCLUSION:   1.  Acute appendicitis. No bowel obstruction or abscess.         Final Pathology Result:   Pending at time of discharge         Discharge Instructions:     - No lifting greater than 15 pounds for 8 weeks after surgery.   - You may shower and get incisions wet starting 48 hrs after surgery but do not scrub incisions or submerge wounds (aka, bath, pool, hot tub, ect.) for  2 weeks. Remove outer dressing (if placed) after 48 hrs from surgery. If dermabond was used, avoid applying any lotions or ointments. If steri-strips were used, they will fall off on their own. You may leave open to air or cover with dry gauze if needed for comfort.  - No driving while taking narcotic pain medications.   - If you develop constipation, take stool softeners such as colace or miralax but stop if you develop diarrhea. Wean yourself off of narcotics.   - Call your primary provider to touch base with them regarding your recent admission.   - If you develop any fever/chills, worsening pain, redness, swelling, or drainage from your wound please call (Clinic [phone]).          Follow-Up:     - Follow up with Dr. Marsh in clinic in 1-2 week(s) after discharge. You should be called to make an appointment within 3 business days. If you are not contacted, call [phone] to make an appointment        Home Health Care:   Not needed           Discharge Disposition:   Discharged to home      Condition at discharge: Stable    Avril Ace MD on 2/15/2019 at 9:49 AM  PGY-1, General Surgery

## 2019-02-15 NOTE — PROGRESS NOTES
Patient discharged home. PIV removed. Patient has all belongings, understands and agrees with discharge instructions. Transport will take patient to pharmacy and lobby [place]

## 2019-02-15 NOTE — PROVIDER NOTIFICATION
General surgery text paged that patient does not have a ride home tonight since family is at [place] with son.

## 2019-02-15 NOTE — PROGRESS NOTES
Observation goals PER UNIT ROUTINE     Comments: List all goals to be met before discharge home:   - Adequate pain control on oral analgesia: Yes, pain managed with prn Oxycodone and effective per Pt.     - Vital Signs normal or at patient baseline:Yes,Pts, /75 (BP Location: Right arm)   Pulse 51   Temp 98.1  F (36.7  C) (Oral)   Resp 18   Wt 68 kg (150 lb)   SpO2 100%   .   - Tolerating oral intake to maintain hydration: Yes, ate during the night and retained. No n/v reported.      - Diagnostic tests and consults completed and resulted: Yes discharge order in place and Pt understands he is going home this AM.  - Cleared for discharge from consultants' standpoint if involved in care: Anticipates going home this AM.    - Safe disposition plan has been identified: Yes    - Return to baseline functional status: Yes, independent with ambulation to bathroom. Weaned to RA this AM.Capno within define limits and turned off this AM.  - Nurse to notify provider when observation goals have been met and patient is ready for discharge

## 2019-02-18 ENCOUNTER — PATIENT OUTREACH (OUTPATIENT)
Dept: SURGERY | Facility: CLINIC | Age: 34
End: 2019-02-18

## 2019-02-18 LAB — COPATH REPORT: NORMAL

## 2019-02-18 NOTE — PROGRESS NOTES
RN Post-Op/Post-Discharge Care Coordination Note    Mr. Jeremias Matias is a 34 year old male who underwent laparoscopic appendectomy on 2/14 with  Dr. Steve Marsh.  Spoke with Patient.    Support  Patient able to care for self independently     Health Status  Fevers/chills: Patient denies any fever or chills.  Nausea/Vomiting: Patient denies nausea/vomiting.  Eating/drinking: Patient is able to eat and drink without any complaints.  Bowel habits: Patient reports constipation but he is passing flatus.  Patient reports taking Senna 1 tab daily.  Recommended increasing to 1 tab BID.  Last BM 2-3 days ago.  Suggested taking OTC laxative such as Miralax per package instructions.  Drains (MARKO): N/A  Incisions: Patient denies any signs and symptoms of infection..  Wound closure:  Dermabond   Pain: Improving.  Patient is taking Ibuprofen and Tylenol per package instructions.  Reports taking Oxycodone sparingly and recommended weaning off of medication.  New Medications:  Oxycodone, Senna    Activity/Restrictions  No lifting in excess of 15-20 pounds for 3-4 weeks    Equipment  None    Pathology reviewed with patient:  No: Not yet available    Forms/Letters  No    All of his questions were answered including reviewing restrictions and wound care.  He will call this office if he has any further questions and/or concerns.      In lieu of a post-op clinic patient that patient would like to be contacted in approximately 7 days via telephone.  Patient reports that his son had surgery the same day that he did.  Patient is spending time with his son at the hospital.    A copy of this note was routed to the primary surgeon.    Whom and When to Call  Patient acknowledges understanding of how to manage any medication changes and   when to seek medical care.     Patient advised that if after hour medical concerns arise to please call [phone] and choose option 4 to speak to the physician on call.

## 2019-02-21 ENCOUNTER — RECORDS - HEALTHEAST (OUTPATIENT)
Dept: LAB | Facility: CLINIC | Age: 34
End: 2019-02-21

## 2019-02-22 ENCOUNTER — HOSPITAL ENCOUNTER (EMERGENCY)
Facility: CLINIC | Age: 34
Discharge: HOME | End: 2019-02-22
Attending: INTERNAL MEDICINE | Admitting: INTERNAL MEDICINE
Payer: COMMERCIAL

## 2019-02-22 VITALS
HEART RATE: 69 BPM | DIASTOLIC BLOOD PRESSURE: 80 MMHG | TEMPERATURE: 97.6 F | SYSTOLIC BLOOD PRESSURE: 117 MMHG | RESPIRATION RATE: 14 BRPM | OXYGEN SATURATION: 100 %

## 2019-02-22 DIAGNOSIS — L27.0 DRUG RASH: ICD-10-CM

## 2019-02-22 LAB — BACTERIA SPEC CULT: NO GROWTH

## 2019-02-22 PROCEDURE — 99283 EMERGENCY DEPT VISIT LOW MDM: CPT | Mod: Z6 | Performed by: INTERNAL MEDICINE

## 2019-02-22 PROCEDURE — 99282 EMERGENCY DEPT VISIT SF MDM: CPT | Performed by: INTERNAL MEDICINE

## 2019-02-22 RX ORDER — PREDNISONE 20 MG/1
40 TABLET ORAL DAILY
Qty: 10 TABLET | Refills: 0 | Status: SHIPPED | OUTPATIENT
Start: 2019-02-22 | End: 2019-02-27

## 2019-02-22 RX ORDER — HYDROXYZINE PAMOATE 50 MG/1
50 CAPSULE ORAL 3 TIMES DAILY PRN
Qty: 30 CAPSULE | Refills: 0 | Status: SHIPPED | OUTPATIENT
Start: 2019-02-22 | End: 2019-03-24

## 2019-02-22 SDOH — HEALTH STABILITY: MENTAL HEALTH: HOW OFTEN DO YOU HAVE A DRINK CONTAINING ALCOHOL?: NEVER

## 2019-02-22 ASSESSMENT — ENCOUNTER SYMPTOMS
CONFUSION: 0
NECK STIFFNESS: 0
FEVER: 0
COLOR CHANGE: 0
EYE REDNESS: 0
ARTHRALGIAS: 0
HEADACHES: 0
DIFFICULTY URINATING: 0
SHORTNESS OF BREATH: 0
ABDOMINAL PAIN: 0

## 2019-02-22 NOTE — ED AVS SNAPSHOT
[facility], [place], Emergency Department  [address]  [facility] [address]  Phone:  [phone]  Fax:  [phone]                                    Jeremias Matias   MRN: [id number]    Department:  [facility], Emergency Department   Date of Visit:  2/22/2019           After Visit Summary Signature Page    I have received my discharge instructions, and my questions have been answered. I have discussed any challenges I see with this plan with the nurse or doctor.    ..........................................................................................................................................  Patient/Patient Representative Signature      ..........................................................................................................................................  Patient Representative Print Name and Relationship to Patient    ..................................................               ................................................  Date                                   Time    ..........................................................................................................................................  Reviewed by Signature/Title    ...................................................              ..............................................  Date                                               Time          22EPIC Rev 08/18

## 2019-02-23 NOTE — ED PROVIDER NOTES
[facility] EMERGENCY DEPARTMENT ([facility])    2/22/19       History     Chief Complaint   Patient presents with     Allergic Reaction     rash: itchy: started yesterday: cause two things?     The history is provided by the patient and medical records.     Jeremias Matias is a 34 year old male who is s/p laparoscopic appendectomy (2/14/2019), who presents to the Emergency Department for evaluation of a generalized rash and itchiness. The patient reports that he visited his PCP clinic ([facility]), yesterday who prescribed him Baxtrum.  He reports taking this last night.  He also reports that yesterday his house was being cleaned which generated smoke and fumes.  Last night, he was unable to sleep and is unsure if this is a result of the new medication or the environmental exposure yesterday.  The patient reports that he felt better this morning and that he took a shower which improved his symptoms. He denies itching or rash in his groin or mouth.  Per review of patient's medical record, he was not prescribed antibiotics following appendectomy.  During a post op RN call on 2/18/2019, he reported taking his prescribed oxycodone along with Senna.    I have reviewed the Medications, Allergies, Past Medical and Surgical History, and Social History in the Epic system.    History reviewed. No pertinent past medical history.    Past Surgical History:   Procedure Laterality Date     LAPAROSCOPIC APPENDECTOMY N/A 2/14/2019    Procedure: LAPAROSCOPIC APPENDECTOMY;  Surgeon: Florencio Marsh MD;  Location:  OR       No family history on file.    Social History     Tobacco Use     Smoking status: Never Smoker   Substance Use Topics     Alcohol use: No     Frequency: Never       No current facility-administered medications for this encounter.      Current Outpatient Medications   Medication     hydrOXYzine (VISTARIL) 50 MG capsule     predniSONE (DELTASONE) 20 MG tablet     senna-docusate  (SENOKOT-S/PERICOLACE) 8.6-50 MG tablet      No Known Allergies     Review of Systems   Constitutional: Negative for fever.   HENT: Negative for congestion.    Eyes: Negative for redness.   Respiratory: Negative for shortness of breath.    Cardiovascular: Negative for chest pain.   Gastrointestinal: Negative for abdominal pain.   Genitourinary: Negative for difficulty urinating.   Musculoskeletal: Negative for arthralgias and neck stiffness.   Skin: Positive for rash (generalized body rash and itching). Negative for color change.   Neurological: Negative for headaches.   Psychiatric/Behavioral: Negative for confusion.       Physical Exam   BP: 115/69  Pulse: 74  Temp: 98.2  F (36.8  C)  Resp: 16  SpO2: 100 %      Physical Exam   Constitutional: No distress.   HENT:   Head: Atraumatic.   Mouth/Throat: Oropharynx is clear and moist.   Eyes: Pupils are equal, round, and reactive to light. No scleral icterus.   Cardiovascular: Normal heart sounds and intact distal pulses.   Pulmonary/Chest: Breath sounds normal. No respiratory distress.   Abdominal: Soft. Bowel sounds are normal. There is no tenderness.   Musculoskeletal: He exhibits no edema or tenderness.   Skin: Skin is warm. Rash noted. He is not diaphoretic.       ED Course   8:08 PM  The patient was seen and examined by Bhakti Ward MD in Room ED04.       Procedures        No results found for this visit on 02/22/19 (from the past 24 hour(s)).        Labs Ordered and Resulted from Time of ED Arrival Up to the Time of Departure from the ED - No data to display         Assessments & Plan (with Medical Decision Making)  Drug rash from sulfa/bactrim, stop bactrim and start prednisone, vistaril prn, follow up with his PMD.       I have reviewed the nursing notes.    I have reviewed the findings, diagnosis, plan and need for follow up with the patient.       Medication List      Started    hydrOXYzine 50 MG capsule  Commonly known as:  VISTARIL  50 mg, Oral, 3 TIMES  DAILY PRN     predniSONE 20 MG tablet  Commonly known as:  DELTASONE  40 mg, Oral, DAILY        ASK your doctor about these medications    oxyCODONE 5 MG tablet  Commonly known as:  ROXICODONE  5-10 mg, Oral, EVERY 4 HOURS PRN  Ask about: Should I take this medication?            Final diagnoses:   Drug rash     Edwardo PEDRO, am serving as a trained medical scribe to document services personally performed by Bhakti Ward MD, based on the provider's statements to me.   Bhakti PEDRO MD, was physically present and have reviewed and verified the accuracy of this note documented by Edwardo Beckett.     2/22/2019   [facility], [place], EMERGENCY DEPARTMENT     Bhakti Ward MD  02/23/19 0029

## 2019-02-23 NOTE — DISCHARGE INSTRUCTIONS
Please stop taking bactrim and make an appointment to follow up with Your Primary Care Provider in 5-10 days even if not better.

## 2019-02-26 ENCOUNTER — PATIENT OUTREACH (OUTPATIENT)
Dept: SURGERY | Facility: CLINIC | Age: 34
End: 2019-02-26

## 2019-02-26 NOTE — PROGRESS NOTES
Jeremias Matias was contacted several days post procedure via telephone for a status update and elected to have a telephone follow -up call approximately 10 days after original contact in lieu of a clinic visit with Dr. Steve Taveras.  He continues to do well and the Dermabond has started to peel off.  The patients wounds are healed and the area is C/D/I.  He is afebrile, pain free, and dyllan po; the patient is slowly resuming his normal activity. Patient states he is still feeling constipated. He has been using 1 Senna daily and Miralax prn. Advised patient to increase to 1-2 tablets BID and to take Miralax daily until he is having normal bowel movements. He was seen in the ED for a rash, which he states is getting better. He does complain of some difficulty urinating. Advised patient to see his PCP if this continues.    Pathology was reviewed with the patient: Yes: reviewed    All of Jeremias Matisa question's were answered and  he would like to return to the clinic on a PRN basis.      A copy of this note was routed to his surgeon.              Patient Name: JEREMIAS MATIAS   MR#: [id number]   Specimen #: [id number]   Collected: 2/14/2019   Received: 2/14/2019   Reported: 2/18/2019 17:20   Ordering Phy(s): CRISTOPHER TAVERAS     For improved result formatting, select 'View Enhanced Report Format' under    Linked Documents section.     SPECIMEN(S):   Appendix     FINAL DIAGNOSIS:   APPENDIX, APPENDECTOMY:   - Acute appendicitis with serositis   - Negative for malignancy     I have personally reviewed all specimens and/or slides, including the   listed special stains, and used them   with my medical judgement to determine or confirm the final diagnosis.     Electronically signed out by:     Lawrence Cherry M.D., PhD, [facility]

## 2019-12-28 ENCOUNTER — HOSPITAL ENCOUNTER (EMERGENCY)
Facility: CLINIC | Age: 34
End: 2019-12-28
Payer: COMMERCIAL

## 2020-05-11 ENCOUNTER — RECORDS - HEALTHEAST (OUTPATIENT)
Dept: LAB | Facility: CLINIC | Age: 35
End: 2020-05-11

## 2020-05-13 LAB — BACTERIA SPEC CULT: NORMAL

## 2021-01-22 ENCOUNTER — RECORDS - HEALTHEAST (OUTPATIENT)
Dept: LAB | Facility: CLINIC | Age: 36
End: 2021-01-22

## 2021-01-22 LAB
C REACTIVE PROTEIN LHE: 0.3 MG/DL (ref 0–0.8)
CK SERPL-CCNC: 112 U/L (ref 30–190)

## 2021-01-23 LAB
BACTERIA SPEC CULT: NO GROWTH
MALARIA SMEAR BLD: NORMAL

## 2021-04-19 ENCOUNTER — RECORDS - HEALTHEAST (OUTPATIENT)
Dept: LAB | Facility: CLINIC | Age: 36
End: 2021-04-19

## 2021-04-19 LAB — TSH SERPL DL<=0.005 MIU/L-ACNC: 2.61 UIU/ML (ref 0.3–5)

## 2021-04-20 LAB — 25(OH)D3 SERPL-MCNC: 22.1 NG/ML (ref 30–80)

## 2021-06-01 VITALS — HEIGHT: 69 IN | WEIGHT: 145 LBS | BODY MASS INDEX: 21.48 KG/M2

## 2021-06-19 NOTE — PROGRESS NOTES
Progress Note    Reason for Visit:  Chief Complaint     Hypogonadism          Progress Note:    HPI:     This patient seen saltation at the request of the primary care physician because of erectile dysfunction and loss of libido.    Patient said for 1 year he has been trouble having trouble with erection and erectile dysfunction he is also noticed 6 months ago bilateral gynecomastia.    He is denying any discharge.    The patient has been put on Arimidex since January for 6 months 1 mg daily plus he takes Cialis 5 mg once a week.    He said that the Cialis helped a little bit    He is originally from [facility] he has 5 children been  since 2010.    He is denying family history of prostate cancer.    Review of Systems:    Nervous System: No headache, dizziness, fainting or memory loss. No tingling sensation of hand or feet.  Ears: No hearing loss or ringing in the ears  Eyes: No blurring of vision, redness, itching or dryness.  Nose: No nosebleed or loss of smell  Mouth: No mouth sores or loss of taste  Throat: No hoarseness or difficulty swallowing  Neck: No enlarged thyroid or lymph nodes.  Heart: No chest pain, palpitation or irregular heartbeat. No swelling of hands or feet  Lungs: No shortness of breath, cough, night sweats, wheezing or hemoptysis.  Gastrointestinal: No nausea or vomiting, constipation or diarrhea.  No acid reflux, abdominal pain or blood in stools.  Kidney/Bladdr: No polyuria, polydipsia, nocturia or hematuria.  Genital/Sexual: No loss of libido  Skin: No rash, hair loss or hirsutism.  No abnormal striae  Muscles/Joints/Bones: No morning stiffness, muscle aches and pain or loss of height.    Current Medications:  Current Outpatient Prescriptions   Medication Sig     anastrozole (ARIMIDEX) 1 mg tablet Take 1 mg by mouth daily.     CIALIS 5 mg tablet Take 5 mg by mouth daily.     ibuprofen (ADVIL,MOTRIN) 800 MG tablet Take 800 mg by mouth.       Patients Active Problems:  Patient Active  "Problem List   Diagnosis     Hypogonadism male       History:      has no tobacco, alcohol, and drug history on file.  History   Smoking Status     Not on file   Smokeless Tobacco     Not on file      has no tobacco, alcohol, and drug history on file.  History   Sexual Activity     Sexual activity: Not on file     No past medical history on file.  No family history on file.  No past medical history on file.  No past surgical history on file.    Vitals   height is 5' 9\" (1.753 m) and weight is 145 lb (65.8 kg). His blood pressure is 116/68.         Exam  General appearance: The patient looked well, not in acute distress.  Eyes: no evidence of thyroid eye disease.   Retinal exam: No evidence of diabetic retinopathy.  Mouth and Throat: Normal  Neck: No evidence of thyromegaly, enlarged lymph node or tenderness  Chest: Trachea is central. Chest is clear to auscultation and percussion. Breat sounds are normal.  Cardiovascular exam: JVP is not raised. Heart sounds are normal, no murmurs or rub  Peripheral pulses are palpable.   Abdomen: No masses or tenderness.    Back: No vertebral tenderness or kyphosis.  Extremities: No evidence of leg edema.   Skin: Normal to touch.  No abnormal striae  Neurologic exam:  Visual fields are intact by confrontation, grossly intact. No evidence of peripheral neuropathy.  Detailed foot exam normal.        Diagnosis:  Encounter Diagnoses   Name Primary?     Hypogonadism male        Orders:   Orders Placed This Encounter   Procedures     Creatinine     Potassium     Thyroid Stimulating Hormone (TSH)     T4, Free     Calcium     Parathyroid Hormone Intact     Vitamin D, Total (25-Hydroxy)     Luteinizing Hormone (LH)     Follicle Stimulating Hormone (FSH)     Prolactin     Testosterone, Total         Assessment and Plan: Erectile dysfunction gynecomastia.    I discussed with the patient that we will check his testosterone level today and if it is low we will replace it to check his prolactin " as well and ferritin LH FSH.    If his  testosterone is low I discussed different replacement modality would put him on injection once every 2 weeks 100-200 mg.    I did advise the patient to stop the Arimidex.  We will continue with sadness.    We will look for secondary causes also of hypogonadism.    The patient is denying drinking or smoking and he does not abuse drugs.    He has 2 children with spina bifida.    Patient will return to clinic in 6 month.  Testicular exam shows normal adult male testicles of 15 mL no masses or lumps no hernias or varicoceles.    I have reviewed and ordered clinical lab test    I have reviewed and ordered radiology tests.    I have reviewed and ordered her medication as required.    I have reviewed her test results and advised with the performing physician.    I have reviewed the patient's old records.    I have reviewed and summarized the patient old records.    I did spend 60 minutes with the patient more than 50% was spent on counseling and managing her care.

## 2022-03-24 ENCOUNTER — LAB REQUISITION (OUTPATIENT)
Dept: LAB | Facility: CLINIC | Age: 37
End: 2022-03-24

## 2022-03-24 DIAGNOSIS — Z00.00 ENCOUNTER FOR GENERAL ADULT MEDICAL EXAMINATION WITHOUT ABNORMAL FINDINGS: ICD-10-CM

## 2022-03-24 DIAGNOSIS — E55.9 VITAMIN D DEFICIENCY, UNSPECIFIED: ICD-10-CM

## 2022-03-24 LAB
ALBUMIN SERPL-MCNC: 4 G/DL (ref 3.5–5)
ALP SERPL-CCNC: 66 U/L (ref 45–120)
ALT SERPL W P-5'-P-CCNC: 15 U/L (ref 0–45)
ANION GAP SERPL CALCULATED.3IONS-SCNC: 10 MMOL/L (ref 5–18)
AST SERPL W P-5'-P-CCNC: 17 U/L (ref 0–40)
BILIRUB SERPL-MCNC: 0.6 MG/DL (ref 0–1)
BUN SERPL-MCNC: 8 MG/DL (ref 8–22)
CALCIUM SERPL-MCNC: 9.1 MG/DL (ref 8.5–10.5)
CHLORIDE BLD-SCNC: 104 MMOL/L (ref 98–107)
CHOLEST SERPL-MCNC: 155 MG/DL
CO2 SERPL-SCNC: 25 MMOL/L (ref 22–31)
CREAT SERPL-MCNC: 0.8 MG/DL (ref 0.7–1.3)
FASTING STATUS PATIENT QL REPORTED: NORMAL
GFR SERPL CREATININE-BSD FRML MDRD: >90 ML/MIN/1.73M2
GLUCOSE BLD-MCNC: 86 MG/DL (ref 70–125)
HDLC SERPL-MCNC: 40 MG/DL
LDLC SERPL CALC-MCNC: 100 MG/DL
POTASSIUM BLD-SCNC: 3.8 MMOL/L (ref 3.5–5)
PROT SERPL-MCNC: 7.5 G/DL (ref 6–8)
SODIUM SERPL-SCNC: 139 MMOL/L (ref 136–145)
TRIGL SERPL-MCNC: 76 MG/DL

## 2022-03-24 PROCEDURE — 82306 VITAMIN D 25 HYDROXY: CPT | Performed by: FAMILY MEDICINE

## 2022-03-24 PROCEDURE — 80061 LIPID PANEL: CPT | Performed by: FAMILY MEDICINE

## 2022-03-24 PROCEDURE — 80053 COMPREHEN METABOLIC PANEL: CPT | Performed by: FAMILY MEDICINE

## 2022-03-27 LAB — DEPRECATED CALCIDIOL+CALCIFEROL SERPL-MC: 47 UG/L (ref 20–75)

## 2024-01-10 ENCOUNTER — LAB REQUISITION (OUTPATIENT)
Dept: LAB | Facility: CLINIC | Age: 39
End: 2024-01-10

## 2024-01-10 ENCOUNTER — TRANSFERRED RECORDS (OUTPATIENT)
Dept: HEALTH INFORMATION MANAGEMENT | Facility: CLINIC | Age: 39
End: 2024-01-10
Payer: COMMERCIAL

## 2024-01-10 ENCOUNTER — MEDICAL CORRESPONDENCE (OUTPATIENT)
Dept: HEALTH INFORMATION MANAGEMENT | Facility: CLINIC | Age: 39
End: 2024-01-10
Payer: COMMERCIAL

## 2024-01-10 DIAGNOSIS — E34.9 ENDOCRINE DISORDER, UNSPECIFIED: ICD-10-CM

## 2024-01-10 DIAGNOSIS — E55.9 VITAMIN D DEFICIENCY, UNSPECIFIED: ICD-10-CM

## 2024-01-10 DIAGNOSIS — Z00.00 ENCOUNTER FOR GENERAL ADULT MEDICAL EXAMINATION WITHOUT ABNORMAL FINDINGS: ICD-10-CM

## 2024-01-10 LAB
ALBUMIN SERPL BCG-MCNC: 4.3 G/DL (ref 3.5–5.2)
ALP SERPL-CCNC: 65 U/L (ref 40–150)
ALT SERPL W P-5'-P-CCNC: 20 U/L (ref 0–70)
ANION GAP SERPL CALCULATED.3IONS-SCNC: 12 MMOL/L (ref 7–15)
AST SERPL W P-5'-P-CCNC: 22 U/L (ref 0–45)
BILIRUB SERPL-MCNC: 0.3 MG/DL
BUN SERPL-MCNC: 8.4 MG/DL (ref 6–20)
CALCIUM SERPL-MCNC: 9.5 MG/DL (ref 8.6–10)
CHLORIDE SERPL-SCNC: 104 MMOL/L (ref 98–107)
CHOLEST SERPL-MCNC: 174 MG/DL
CREAT SERPL-MCNC: 0.72 MG/DL (ref 0.67–1.17)
DEPRECATED HCO3 PLAS-SCNC: 24 MMOL/L (ref 22–29)
EGFRCR SERPLBLD CKD-EPI 2021: >90 ML/MIN/1.73M2
FASTING STATUS PATIENT QL REPORTED: ABNORMAL
GLUCOSE SERPL-MCNC: 91 MG/DL (ref 70–99)
HBA1C MFR BLD: 5.6 %
HDLC SERPL-MCNC: 45 MG/DL
LDLC SERPL CALC-MCNC: 119 MG/DL
NONHDLC SERPL-MCNC: 129 MG/DL
POTASSIUM SERPL-SCNC: 4 MMOL/L (ref 3.4–5.3)
PROT SERPL-MCNC: 7.2 G/DL (ref 6.4–8.3)
SHBG SERPL-SCNC: 53 NMOL/L (ref 11–80)
SODIUM SERPL-SCNC: 140 MMOL/L (ref 135–145)
TRIGL SERPL-MCNC: 52 MG/DL
VIT D+METAB SERPL-MCNC: 26 NG/ML (ref 20–50)

## 2024-01-10 PROCEDURE — 82306 VITAMIN D 25 HYDROXY: CPT | Performed by: FAMILY MEDICINE

## 2024-01-10 PROCEDURE — 83718 ASSAY OF LIPOPROTEIN: CPT | Performed by: FAMILY MEDICINE

## 2024-01-10 PROCEDURE — 84270 ASSAY OF SEX HORMONE GLOBUL: CPT | Performed by: FAMILY MEDICINE

## 2024-01-10 PROCEDURE — 83036 HEMOGLOBIN GLYCOSYLATED A1C: CPT | Performed by: FAMILY MEDICINE

## 2024-01-10 PROCEDURE — 84403 ASSAY OF TOTAL TESTOSTERONE: CPT | Performed by: FAMILY MEDICINE

## 2024-01-10 PROCEDURE — 80053 COMPREHEN METABOLIC PANEL: CPT | Performed by: FAMILY MEDICINE

## 2024-01-11 ENCOUNTER — TRANSCRIBE ORDERS (OUTPATIENT)
Dept: OTHER | Age: 39
End: 2024-01-11

## 2024-01-11 DIAGNOSIS — E34.9 HYPOTESTOSTERONISM: Primary | ICD-10-CM

## 2024-01-12 LAB
TESTOST FREE SERPL-MCNC: 11.03 NG/DL
TESTOST SERPL-MCNC: 681 NG/DL (ref 240–950)

## 2024-03-19 ENCOUNTER — TELEPHONE (OUTPATIENT)
Dept: ENDOCRINOLOGY | Facility: CLINIC | Age: 39
End: 2024-03-19
Payer: COMMERCIAL

## 2024-03-19 NOTE — TELEPHONE ENCOUNTER
Spoke with pt and changed time on 8/8 appt; provider template change   Amira Suero on 3/19/2024 at 4:41 PM

## 2024-07-12 NOTE — CONFIDENTIAL NOTE
RECORDS RECEIVED FROM:    DATE RECEIVED: 8/8/24    NOTES (FOR ALL VISITS) STATUS DETAILS   OFFICE NOTES from referring provider internal    Lou Mendoza MD      MEDICATION LIST internal     LABS     DIABETES: HBGA1C, CREATININE, FASTING LIPIDS, MICROALBUMIN URINE, POTASSIUM, TSH, T4    THYROID: TSH, T4, CBC, THYRODLONULIN, TOTAL T3, FREE T4, CALCITONIN, CEA internal /ce  Lipid- 2.7.24  BMP- 2.7.24  HBGA1C- 2.7.24  Testosterone free- 2.7.24  Vitamin D- 2.7.24  CBC- 2.7.24

## 2024-08-07 PROBLEM — N52.9 MALE ERECTILE DISORDER: Status: ACTIVE | Noted: 2018-10-18

## 2024-08-07 PROBLEM — E29.1 HYPOGONADISM MALE: Status: ACTIVE | Noted: 2018-07-09

## 2024-08-07 PROBLEM — E55.9 VITAMIN D INSUFFICIENCY: Status: ACTIVE | Noted: 2018-07-09

## 2024-08-07 PROBLEM — N62 GYNECOMASTIA: Status: ACTIVE | Noted: 2018-10-18

## 2024-08-08 ENCOUNTER — PRE VISIT (OUTPATIENT)
Dept: ENDOCRINOLOGY | Facility: CLINIC | Age: 39
End: 2024-08-08

## (undated) DEVICE — ESU ENDO SCISSORS 5MM CVD 5DCS

## (undated) DEVICE — PREP CHLORAPREP 26ML TINTED ORANGE  260815

## (undated) DEVICE — ENDO TROCAR SLEEVE KII Z-THREADED 05X100MM CTS02

## (undated) DEVICE — ENDO BABCOCK 05MM 5BB

## (undated) DEVICE — WIPES FOLEY CARE SURESTEP PROVON DFC100

## (undated) DEVICE — SU VICRYL 0 UR-6 27" J603H

## (undated) DEVICE — GLOVE PROTEXIS POWDER FREE SMT 7.5  2D72PT75X

## (undated) DEVICE — SOL WATER IRRIG 1000ML BOTTLE 2F7114

## (undated) DEVICE — STPL POWERED ECHELON 45MM PSEE45A

## (undated) DEVICE — CATH COUDE TIEMAN 16FR LATEX 010116

## (undated) DEVICE — LINEN TOWEL PACK X5 5464

## (undated) DEVICE — GLOVE PROTEXIS BLUE W/NEU-THERA 7.5  2D73EB75

## (undated) DEVICE — Device

## (undated) DEVICE — ESU GROUND PAD ADULT W/CORD E7507

## (undated) DEVICE — SU MONOCRYL 4-0 PS-2 27" UND Y426H

## (undated) DEVICE — ESU PENCIL W/COATED BLADE E2450H

## (undated) DEVICE — ENDO TROCAR BLUNT TIP KII BALLOON 12X100MM C0R47

## (undated) DEVICE — CATH FOLEY 14FR 5ML SILVER COAT LATEX 0165SI14

## (undated) DEVICE — CATH TRAY FOLEY SURESTEP 16FR W/URNE MTR STLK LATEX A303316A

## (undated) DEVICE — ENDO TROCAR FIRST ENTRY KII FIOS Z-THRD 05X100MM CTF03

## (undated) DEVICE — LINEN TOWEL PACK X6 WHITE 5487

## (undated) DEVICE — SU DERMABOND ADVANCED .7ML DNX12

## (undated) DEVICE — STPL RELOAD REG TISSUE ECHELON 45 X 3.6MM BLUE GST45B

## (undated) DEVICE — ENDO POUCH UNIV RETRIEVAL SYSTEM INZII 10MM CD001

## (undated) DEVICE — ANTIFOG SOLUTION W/FOAM PAD 31142527

## (undated) DEVICE — SUCTION MANIFOLD DORNOCH ULTRA CART UL-CL500

## (undated) DEVICE — SYR 10ML SLIP TIP W/O NDL 303134

## (undated) RX ORDER — FENTANYL CITRATE 50 UG/ML
INJECTION, SOLUTION INTRAMUSCULAR; INTRAVENOUS
Status: DISPENSED
Start: 2019-02-14

## (undated) RX ORDER — HYDROMORPHONE HYDROCHLORIDE 1 MG/ML
INJECTION, SOLUTION INTRAMUSCULAR; INTRAVENOUS; SUBCUTANEOUS
Status: DISPENSED
Start: 2019-02-14

## (undated) RX ORDER — SODIUM CHLORIDE, SODIUM LACTATE, POTASSIUM CHLORIDE, CALCIUM CHLORIDE 600; 310; 30; 20 MG/100ML; MG/100ML; MG/100ML; MG/100ML
INJECTION, SOLUTION INTRAVENOUS
Status: DISPENSED
Start: 2019-02-14